# Patient Record
Sex: MALE | Race: WHITE
[De-identification: names, ages, dates, MRNs, and addresses within clinical notes are randomized per-mention and may not be internally consistent; named-entity substitution may affect disease eponyms.]

---

## 2019-09-25 ENCOUNTER — HOSPITAL ENCOUNTER (OUTPATIENT)
Dept: HOSPITAL 53 - M SMT | Age: 79
End: 2019-09-25
Attending: NURSE PRACTITIONER
Payer: OTHER GOVERNMENT

## 2019-09-25 DIAGNOSIS — R97.20: Primary | ICD-10-CM

## 2019-09-25 LAB
APPEARANCE UR: CLEAR
BACTERIA UR QL AUTO: NEGATIVE
BILIRUB UR QL STRIP.AUTO: NEGATIVE
GLUCOSE UR QL STRIP.AUTO: NEGATIVE MG/DL
HGB UR QL STRIP.AUTO: NEGATIVE
KETONES UR QL STRIP.AUTO: NEGATIVE MG/DL
LEUKOCYTE ESTERASE UR QL STRIP.AUTO: (no result)
MUCOUS THREADS URNS QL MICRO: (no result)
NITRITE UR QL STRIP.AUTO: NEGATIVE
PH UR STRIP.AUTO: 6 UNITS (ref 5–9)
PROT UR QL STRIP.AUTO: NEGATIVE MG/DL
RBC # UR AUTO: 5 /HPF (ref 0–3)
SP GR UR STRIP.AUTO: 1.01 (ref 1–1.03)
SQUAMOUS #/AREA URNS AUTO: 0 /HPF (ref 0–6)
UROBILINOGEN UR QL STRIP.AUTO: 0.2 MG/DL (ref 0–2)
WBC #/AREA URNS AUTO: 3 /HPF (ref 0–3)

## 2019-09-25 PROCEDURE — 87086 URINE CULTURE/COLONY COUNT: CPT

## 2019-09-25 PROCEDURE — 81001 URINALYSIS AUTO W/SCOPE: CPT

## 2019-10-08 ENCOUNTER — HOSPITAL ENCOUNTER (OUTPATIENT)
Dept: HOSPITAL 53 - M SMT PRO | Age: 79
End: 2019-10-08
Attending: UROLOGY
Payer: OTHER GOVERNMENT

## 2019-10-08 ENCOUNTER — HOSPITAL ENCOUNTER (OUTPATIENT)
Dept: HOSPITAL 53 - M SMT | Age: 79
End: 2019-10-08
Attending: UROLOGY
Payer: OTHER GOVERNMENT

## 2019-10-08 DIAGNOSIS — R97.20: Primary | ICD-10-CM

## 2019-10-08 PROCEDURE — 36415 COLL VENOUS BLD VENIPUNCTURE: CPT

## 2019-10-08 PROCEDURE — 76942 ECHO GUIDE FOR BIOPSY: CPT

## 2019-10-08 PROCEDURE — 84153 ASSAY OF PSA TOTAL: CPT

## 2019-10-08 PROCEDURE — 76872 US TRANSRECTAL: CPT

## 2019-10-08 NOTE — REP
Prostate sonography:

 

History: Elevated PSA

 

Sonographic findings:  Trans rectal prostate sonography demonstrates unremarkable

seminal vesicles.  Prostate gland is heterogeneously enlarged with calcifications

and cystic changes noted.  Glandular dimensions are measured at 3.9 x 4.1 x 2.9

cm with a calculated glandular volume of 24.1 ml.

 

Transrectal sonographic guidance is provided to Dr. Orlando who performed trans

rectal ultrasound guided needle biopsy procedure .

 

 

Electronically Signed by

Paul Ring MD 10/08/2019 02:56 P

## 2019-10-21 ENCOUNTER — HOSPITAL ENCOUNTER (OUTPATIENT)
Dept: HOSPITAL 53 - M RAD | Age: 79
End: 2019-10-21
Attending: UROLOGY
Payer: OTHER GOVERNMENT

## 2019-10-21 DIAGNOSIS — C61: Primary | ICD-10-CM

## 2019-10-21 PROCEDURE — 78306 BONE IMAGING WHOLE BODY: CPT

## 2019-10-21 PROCEDURE — 74177 CT ABD & PELVIS W/CONTRAST: CPT

## 2019-10-21 NOTE — REP
WHOLE BODY BONE SCAN:

 

Following the intravenous administration of 22 mCi technetium 99m MDP, patient's

whole body is imaged in the anterior and posterior projections with additional

oblique and lateral views obtained. There is focal intense increased uptake in

the anterior end of the right 5th and adjacent 6th ribs. These foci are most

consistent with fractures. Patient does report history of recent fall. There

appears to be arthritic uptake in the cervical facets bilaterally as well as in

the region of the lumbosacral junction. There appears to be mild arthritic uptake

at the shoulders and in both feet. There is linear increased uptake along the

anterolateral cortex of the proximal left tibia, which may represent

stress-related changes or bone bruising. I do not see compelling scintigraphic

evidence of osseous metastases. Renal and bladder activity are seen.

 

IMPRESSION:

 

Two adjacent foci of increased uptake in the anterior end of the right 5th and

6th ribs are most consistent with fractures at these locations. There is mild

scattered arthritic uptake without compelling scintigraphic evidence of osseous

metastases.

 

 

 

 

Electronically Signed by

Shan Caba MD 10/23/2019 09:24 A

## 2019-10-21 NOTE — REP
Clinical:  History of prostate cancer.

 

Technique:  Axial contrast enhanced images from the lung bases to the pubic

symphysis using 100 ml Isovue 370 intravenous contrast material with coronal and

sagittal re-formations.  Delayed images of the abdomen obtained.

 

Comparison:  None.

 

Findings:

Lung fields demonstrate advanced emphysematous changes.  Visualized heart and

pericardium grossly normal.

 

Liver, spleen, pancreas, gallbladder, bilateral adrenal glands and kidneys are

within normal limits.  Incidental splenic calcifications consistent with prior

granulomatous disease.  The enteric system is without obstruction or acute

inflammatory process.  Colonic and sigmoid diverticulosis noted without acute

diverticulitis.  Pelvis demonstrates normal bladder.  Prostate gland is

heterogeneous by CT evaluation.  No ascites.  No free air.  No significant

adenopathy.  Atherosclerotic changes to the aorta and vasculature noted without

aneurysm or dissection.  Musculoskeletal structures demonstrate degenerative

changes without focal osseous abnormality.

 

Impression:

1.  Diverticulosis without acute diverticulitis.

2.  No evidence for mass lesion or metastatic disease.

 

 

Electronically Signed by

Dudley Archuleta MD 10/21/2019 07:34 P

## 2019-11-26 ENCOUNTER — HOSPITAL ENCOUNTER (OUTPATIENT)
Dept: HOSPITAL 53 - M SMT PRO | Age: 79
End: 2019-11-26
Attending: UROLOGY
Payer: OTHER GOVERNMENT

## 2019-11-26 DIAGNOSIS — C61: Primary | ICD-10-CM

## 2019-11-26 PROCEDURE — 76872 US TRANSRECTAL: CPT

## 2019-11-26 PROCEDURE — 76942 ECHO GUIDE FOR BIOPSY: CPT

## 2019-11-26 PROCEDURE — 55876 PLACE RT DEVICE/MARKER PROS: CPT

## 2019-11-26 NOTE — REPPI
TRANSRECTAL RECTAL ULTRASOUND GUIDANCE FOR PROSTATE FIDUCIARY MARKER PLACEMENT:

 

Transrectal prostate ultrasound guidance was provided for Dr. Orlando who placed

three fiduciary markers in the region of the prostate, one at the right base, one

at the left base, and one in the midline at the apex.

 

 

Electronically Signed by

Shan Caba MD 11/26/2019 02:33 P